# Patient Record
Sex: MALE | Race: WHITE | NOT HISPANIC OR LATINO | Employment: OTHER | URBAN - METROPOLITAN AREA
[De-identification: names, ages, dates, MRNs, and addresses within clinical notes are randomized per-mention and may not be internally consistent; named-entity substitution may affect disease eponyms.]

---

## 2018-06-05 ENCOUNTER — PREPPED CHART (OUTPATIENT)
Dept: URBAN - METROPOLITAN AREA CLINIC 19 | Facility: CLINIC | Age: 63
End: 2018-06-05

## 2022-04-07 ASSESSMENT — VISUAL ACUITY
OD_CC: 20/30-1
OU_CC: J1+
OS_CC: 20/25-1

## 2022-04-07 ASSESSMENT — KERATOMETRY
OS_K2POWER_DIOPTERS: 45.25
OD_K2POWER_DIOPTERS: 45.25
OD_K1POWER_DIOPTERS: 43.50
OS_AXISANGLE_DEGREES: 171
OS_K1POWER_DIOPTERS: 43.50
OD_AXISANGLE_DEGREES: 15
OD_AXISANGLE2_DEGREES: 105
OS_AXISANGLE2_DEGREES: 81

## 2022-04-07 ASSESSMENT — TONOMETRY
OD_IOP_MMHG: 24
OS_IOP_MMHG: 24

## 2022-04-11 ENCOUNTER — NEW PATIENT (OUTPATIENT)
Dept: URBAN - METROPOLITAN AREA CLINIC 19 | Facility: CLINIC | Age: 67
End: 2022-04-11

## 2022-04-11 DIAGNOSIS — H02.88A: ICD-10-CM

## 2022-04-11 DIAGNOSIS — H02.88B: ICD-10-CM

## 2022-04-11 DIAGNOSIS — H25.813: ICD-10-CM

## 2022-04-11 DIAGNOSIS — H52.4: ICD-10-CM

## 2022-04-11 PROCEDURE — 92004 COMPRE OPH EXAM NEW PT 1/>: CPT

## 2022-04-11 ASSESSMENT — VISUAL ACUITY
OS_CC: 20/25-2
OD_CC: 20/30-1
OU_CC: 20/25

## 2022-04-11 ASSESSMENT — KERATOMETRY
OS_K1POWER_DIOPTERS: 43.50
OS_AXISANGLE_DEGREES: 167
OS_K2POWER_DIOPTERS: 45.00
OD_AXISANGLE_DEGREES: 27
OD_K2POWER_DIOPTERS: 45.00
OD_AXISANGLE2_DEGREES: 117
OD_K1POWER_DIOPTERS: 43.25
OS_AXISANGLE2_DEGREES: 77

## 2022-04-11 ASSESSMENT — TONOMETRY
OS_IOP_MMHG: 18
OD_IOP_MMHG: 19

## 2022-04-15 NOTE — PATIENT DISCUSSION
Consider pt's current vision needs when deciding on IOL. She may do best with basic because her astig helps her to read at this time w/o glasses.

## 2022-11-07 NOTE — PATIENT DISCUSSION
Pt would like to have consult for surgery with Dr Angy Ramos. Discussed options and she is an avid reader and reads w/o glasses for many things due to the myopia/astig. Rec she have custom near or at least Basic and no correct the astig. She declines the Advanced lenses.

## 2022-12-14 NOTE — PATIENT DISCUSSION
Elects CV -2.00/-2.25 OU. Pt would like to remain nearsighted. Understands no guarantee of any vision without correction. Will need glasses for sharpest quality of vision.

## 2023-01-24 NOTE — PATIENT DISCUSSION
Cataract surgery has been performed in the first eye and activities of daily living are still impaired. The patient would like to proceed with cataract surgery in the second eye as scheduled. The patient elects Custom OS, goal of -2.00.

## 2023-01-26 NOTE — PATIENT DISCUSSION
CV -2.00/-2.25. Continue working on healthy eating and moving as best as you can (start low and slow, work up to 30 min, 5x/week)    BG goals:  Fasting and before meals <130, >70  2 hour after eating <180    We only need 1/2 of these numbers to be within target then your A1c will be within target    Medication changes   1/2 the amount of carb for lunch before work.    Let me if you have continued issues with low BGs    Follow up   Dr. Alvarez--download  3 months    Call me sooner if any problems/concerns and/or questions develop including consistent low BGs <70 or consistent high BGs >200  [phone] (Unit Coordinator)    [phone] (Nurse)    A1c:   Lab Results   Component Value Date    A1C 7.8 06/03/2021    A1C 7.5 11/30/2020    A1C 7.7 09/01/2020    A1C 8.1 03/24/2020    A1C 7.2 12/12/2019

## 2023-04-13 ENCOUNTER — ESTABLISHED PATIENT (OUTPATIENT)
Dept: URBAN - METROPOLITAN AREA CLINIC 19 | Facility: CLINIC | Age: 68
End: 2023-04-13

## 2023-04-13 DIAGNOSIS — H52.4: ICD-10-CM

## 2023-04-13 DIAGNOSIS — H02.88A: ICD-10-CM

## 2023-04-13 DIAGNOSIS — H02.88B: ICD-10-CM

## 2023-04-13 DIAGNOSIS — H25.813: ICD-10-CM

## 2023-04-13 PROCEDURE — 92014 COMPRE OPH EXAM EST PT 1/>: CPT

## 2023-04-13 ASSESSMENT — KERATOMETRY
OD_K2POWER_DIOPTERS: 45.25
OD_AXISANGLE_DEGREES: 15
OS_AXISANGLE_DEGREES: 170
OS_K2POWER_DIOPTERS: 45.00
OS_K1POWER_DIOPTERS: 43.75
OS_AXISANGLE2_DEGREES: 80
OD_K1POWER_DIOPTERS: 43.75
OD_AXISANGLE2_DEGREES: 105

## 2023-04-13 ASSESSMENT — TONOMETRY
OD_IOP_MMHG: 19
OS_IOP_MMHG: 20

## 2023-04-13 ASSESSMENT — VISUAL ACUITY
OD_CC: 20/40+1
OS_CC: 20/25-3
OD_PH: 20/30-1
OU_CC: 20/25-2
OU_CC: J1-2

## 2024-05-20 ENCOUNTER — ESTABLISHED PATIENT (OUTPATIENT)
Dept: URBAN - METROPOLITAN AREA CLINIC 19 | Facility: CLINIC | Age: 69
End: 2024-05-20

## 2024-05-20 DIAGNOSIS — H02.88B: ICD-10-CM

## 2024-05-20 DIAGNOSIS — H52.4: ICD-10-CM

## 2024-05-20 DIAGNOSIS — H02.88A: ICD-10-CM

## 2024-05-20 DIAGNOSIS — H25.813: ICD-10-CM

## 2024-05-20 PROCEDURE — 92015 DETERMINE REFRACTIVE STATE: CPT

## 2024-05-20 PROCEDURE — 92014 COMPRE OPH EXAM EST PT 1/>: CPT

## 2024-05-20 ASSESSMENT — KERATOMETRY
OD_AXISANGLE_DEGREES: 20
OD_K2POWER_DIOPTERS: 45.00
OS_AXISANGLE_DEGREES: 163
OS_K2POWER_DIOPTERS: 45.00
OS_AXISANGLE2_DEGREES: 73
OD_AXISANGLE2_DEGREES: 110
OS_K1POWER_DIOPTERS: 43.75
OD_K1POWER_DIOPTERS: 43.50

## 2024-05-20 ASSESSMENT — TONOMETRY
OD_IOP_MMHG: 18
OS_IOP_MMHG: 21

## 2024-05-20 ASSESSMENT — VISUAL ACUITY
OD_CC: 20/25-2
OS_CC: 20/20-2
OU_CC: 20/20